# Patient Record
Sex: MALE | Race: WHITE | NOT HISPANIC OR LATINO | Employment: OTHER | URBAN - METROPOLITAN AREA
[De-identification: names, ages, dates, MRNs, and addresses within clinical notes are randomized per-mention and may not be internally consistent; named-entity substitution may affect disease eponyms.]

---

## 2024-08-21 ENCOUNTER — TELEPHONE (OUTPATIENT)
Dept: GASTROENTEROLOGY | Facility: CLINIC | Age: 79
End: 2024-08-21
Payer: MEDICARE

## 2024-08-21 NOTE — TELEPHONE ENCOUNTER
Returned patients call. Let patient know we would need a referral to see him. 8/21/24 LRA     ----- Message from Devika Dutta sent at 8/21/2024  3:51 PM CDT -----  Regarding: Appointment  Contact: patient  Per phone call with patient, he stated that he would like to schedule an appointment to see the physician regarding stomach pain.  The caller has a CT Scan and Xray done at [facility].   Please return call at [phone] (home).    Thanks,  SJ